# Patient Record
Sex: FEMALE | Race: WHITE | NOT HISPANIC OR LATINO
[De-identification: names, ages, dates, MRNs, and addresses within clinical notes are randomized per-mention and may not be internally consistent; named-entity substitution may affect disease eponyms.]

---

## 2020-04-30 ENCOUNTER — MESSAGE (OUTPATIENT)
Age: 70
End: 2020-04-30

## 2020-05-05 ENCOUNTER — APPOINTMENT (OUTPATIENT)
Dept: DISASTER EMERGENCY | Facility: CLINIC | Age: 70
End: 2020-05-05

## 2020-05-06 LAB
SARS-COV-2 IGG SERPL IA-ACNC: 0.5 RATIO
SARS-COV-2 IGG SERPL QL IA: NEGATIVE

## 2020-11-24 ENCOUNTER — TRANSCRIPTION ENCOUNTER (OUTPATIENT)
Age: 70
End: 2020-11-24

## 2021-04-07 ENCOUNTER — TRANSCRIPTION ENCOUNTER (OUTPATIENT)
Age: 71
End: 2021-04-07

## 2021-04-12 ENCOUNTER — TRANSCRIPTION ENCOUNTER (OUTPATIENT)
Age: 71
End: 2021-04-12

## 2021-06-05 ENCOUNTER — TRANSCRIPTION ENCOUNTER (OUTPATIENT)
Age: 71
End: 2021-06-05

## 2021-06-08 ENCOUNTER — TRANSCRIPTION ENCOUNTER (OUTPATIENT)
Age: 71
End: 2021-06-08

## 2022-11-06 ENCOUNTER — NON-APPOINTMENT (OUTPATIENT)
Age: 72
End: 2022-11-06

## 2022-11-08 ENCOUNTER — TRANSCRIPTION ENCOUNTER (OUTPATIENT)
Age: 72
End: 2022-11-08

## 2022-11-08 ENCOUNTER — NON-APPOINTMENT (OUTPATIENT)
Age: 72
End: 2022-11-08

## 2022-11-10 ENCOUNTER — APPOINTMENT (OUTPATIENT)
Dept: DISASTER EMERGENCY | Facility: HOSPITAL | Age: 72
End: 2022-11-10

## 2022-11-10 ENCOUNTER — OUTPATIENT (OUTPATIENT)
Dept: OUTPATIENT SERVICES | Facility: HOSPITAL | Age: 72
LOS: 1 days | End: 2022-11-10

## 2022-11-10 VITALS
OXYGEN SATURATION: 98 % | WEIGHT: 119.93 LBS | RESPIRATION RATE: 18 BRPM | HEART RATE: 70 BPM | SYSTOLIC BLOOD PRESSURE: 155 MMHG | DIASTOLIC BLOOD PRESSURE: 93 MMHG | TEMPERATURE: 98 F | HEIGHT: 63 IN

## 2022-11-10 VITALS
OXYGEN SATURATION: 98 % | DIASTOLIC BLOOD PRESSURE: 60 MMHG | TEMPERATURE: 98 F | HEART RATE: 67 BPM | SYSTOLIC BLOOD PRESSURE: 158 MMHG | RESPIRATION RATE: 18 BRPM

## 2022-11-10 DIAGNOSIS — U07.1 COVID-19: ICD-10-CM

## 2022-11-10 RX ORDER — BEBTELOVIMAB 87.5 MG/ML
175 INJECTION, SOLUTION INTRAVENOUS ONCE
Refills: 0 | Status: COMPLETED | OUTPATIENT
Start: 2022-11-10 | End: 2022-11-10

## 2022-11-10 RX ADMIN — BEBTELOVIMAB 175 MILLIGRAM(S): 87.5 INJECTION, SOLUTION INTRAVENOUS at 08:00

## 2022-11-10 NOTE — CHART NOTE - NSCHARTNOTEFT_GEN_A_CORE
CC: Monoclonal Antibody Infusion/COVID 19 Positive on 11/07/22  72y Female with recent dx of COVID 19+ who presents today for elective Bebtelovimab. Patient has been screened and was deemed to be a candidate.    Symptoms/ Criteria  Date of Symptom Onset: 11/007/22  Symptoms: fever cough sore throat malaise body aches  Date of Positive COVID PCR: 11/07/22  Risk Profile includes:   Age > 65, seizures     Vaccination Status: Moderna with Boosters x2    PMHx:  Infection due to severe acute respiratory syndrome coronavirus 2 (SARS-CoV-2)        Exam/findings:  T(C): 36.8 (11-10-22 @ 07:49), Max: 36.8 (11-10-22 @ 07:49)  HR: 70 (11-10-22 @ 07:49) (70 - 70)  BP: 155/93 (11-10-22 @ 07:49) (155/93 - 155/93)  RR: 18 (11-10-22 @ 07:49) (18 - 18)  SpO2: 98% (11-10-22 @ 07:49) (98% - 98%)    PE:   Appearance: NAD	  HEENT:  NC/AT  Cardiovascular:  No edema  Respiratory: no use of accessory muscles  Gastrointestinal:  non-distended   Skin: warm and dry  Neurologic: Non-focal  Extremities: Normal range of motion    ASSESSMENT:  Pt is COVID positive with mild to moderate symptoms who was referred for elective MAB (Bebtelovimab).    PLAN:  - MAB treatment explained to patient. I have reviewed the Bebtelovimab Emergency Use Authorization (EUA) and I have provided the patient or patient's caregiver with the following information:   1. FDA has authorized emergency use of Bebtelovimab to be administered for the treatment of mild to moderate COVID-19, which is not an FDA-approved biological product.   2. The patient or patient's caregiver has the option to accept or refuse administration of MAB.   3. The significant known and potential risks and benefits of Bebtelovimab and the extent to which such risks and benefits are unknown.  4. Information on available alternative treatments and risks and benefits of those alternatives.  - Patient verbalized understanding of plan and agrees to treatment. All questions answered.  - Consent for MAB obtained.   - 175mg of Bebtelovimab administered as a single intravenous injection over at least 30 seconds.   - Observe patient for one hour post medication administration and then if stable, discharge home with oupatient follow up as planned by PCP.      POST ASSESSMENT:   Patient completed MAB, and monitored x 1 hour post-infusion with no adverse reactions noted, remained hemodynamically stable.  - Patient tolerated infusion well; denies complaints of chest pain/SOB/dizziness/palpitations.   - VSS for discharge home.  - D/C instructions given/ fact sheet included.  - Patient was instructed to self-isolate and use infection control measures (e.g wear mask, isolate, social distance, avoid sharing personal items, clean and disinfect "high touch" surfaces, and frequent handwashing according to the CDC guidelines.   - The patient was informed on what symptoms to be aware of for the next couple of days, and if there are any issues to call the 24/7 clinical call center. Patient was instructed to follow up with primary care provider as needed.  -Discharge 1 hour post-injection

## 2022-12-27 PROBLEM — Z00.00 ENCOUNTER FOR PREVENTIVE HEALTH EXAMINATION: Status: ACTIVE | Noted: 2022-12-27

## 2023-08-27 ENCOUNTER — NON-APPOINTMENT (OUTPATIENT)
Age: 73
End: 2023-08-27

## 2023-11-24 ENCOUNTER — NON-APPOINTMENT (OUTPATIENT)
Age: 73
End: 2023-11-24

## 2023-11-29 ENCOUNTER — APPOINTMENT (OUTPATIENT)
Dept: NEUROSURGERY | Facility: CLINIC | Age: 73
End: 2023-11-29
Payer: COMMERCIAL

## 2023-11-29 DIAGNOSIS — R56.9 UNSPECIFIED CONVULSIONS: ICD-10-CM

## 2023-11-29 DIAGNOSIS — G45.0 VERTEBRO-BASILAR ARTERY SYNDROME: ICD-10-CM

## 2023-11-29 DIAGNOSIS — Z87.891 PERSONAL HISTORY OF NICOTINE DEPENDENCE: ICD-10-CM

## 2023-11-29 PROCEDURE — 99204 OFFICE O/P NEW MOD 45 MIN: CPT

## 2023-11-30 ENCOUNTER — APPOINTMENT (OUTPATIENT)
Dept: MRI IMAGING | Facility: CLINIC | Age: 73
End: 2023-11-30
Payer: COMMERCIAL

## 2023-11-30 ENCOUNTER — OUTPATIENT (OUTPATIENT)
Dept: OUTPATIENT SERVICES | Facility: HOSPITAL | Age: 73
LOS: 1 days | End: 2023-11-30
Payer: COMMERCIAL

## 2023-11-30 DIAGNOSIS — G45.0 VERTEBRO-BASILAR ARTERY SYNDROME: ICD-10-CM

## 2023-11-30 PROCEDURE — 70544 MR ANGIOGRAPHY HEAD W/O DYE: CPT

## 2023-11-30 PROCEDURE — 70544 MR ANGIOGRAPHY HEAD W/O DYE: CPT | Mod: 26

## 2023-12-01 PROBLEM — R56.9 SEIZURES: Status: RESOLVED | Noted: 2023-12-01 | Resolved: 2023-12-01

## 2023-12-01 PROBLEM — Z87.891 FORMER SMOKER: Status: ACTIVE | Noted: 2023-12-01

## 2023-12-01 RX ORDER — LAMOTRIGINE 150 MG/1
TABLET ORAL
Refills: 0 | Status: ACTIVE | COMMUNITY

## 2023-12-01 RX ORDER — EXTENDED PHENYTOIN SODIUM 30 MG/1
CAPSULE ORAL
Refills: 0 | Status: ACTIVE | COMMUNITY

## 2023-12-13 ENCOUNTER — APPOINTMENT (OUTPATIENT)
Dept: NEUROSURGERY | Facility: CLINIC | Age: 73
End: 2023-12-13
Payer: COMMERCIAL

## 2023-12-13 VITALS
HEIGHT: 63 IN | HEART RATE: 72 BPM | TEMPERATURE: 98.6 F | DIASTOLIC BLOOD PRESSURE: 88 MMHG | BODY MASS INDEX: 21.09 KG/M2 | WEIGHT: 119 LBS | SYSTOLIC BLOOD PRESSURE: 159 MMHG | OXYGEN SATURATION: 97 %

## 2023-12-13 DIAGNOSIS — Z78.9 OTHER SPECIFIED HEALTH STATUS: ICD-10-CM

## 2023-12-13 DIAGNOSIS — G93.9 DISORDER OF BRAIN, UNSPECIFIED: ICD-10-CM

## 2023-12-13 PROCEDURE — 99214 OFFICE O/P EST MOD 30 MIN: CPT

## 2023-12-18 NOTE — PHYSICAL EXAM
[FreeTextEntry1] : Constitutional: alert and in no acute distress. Psychiatric: oriented to person, place, and time, insight and judgment were intact and the affect was normal.   Orientation: oriented to person, oriented to place and oriented to time.   Memory: short term memory intact and remote memory intact.   Attention: the attention span was normal and normal concentrating ability.   Language: fluency intact and comprehension intact.   Fund of knowledge: displays adequate knowledge of current events, adequate knowledge of personal past history and adequate range of vocabulary.   Cranial Nerves: extraocular motion intact, facial sensation intact symmetrically, face symmetrical, hearing was intact bilaterally, tongue and palate midline, head turning and shoulder shrug symmetric and there was no tongue deviation with protrusion.   Motor: muscle tone was normal in all four extremities, muscle strength was normal in all four extremities, no involuntary movements were seen and normal bulk in all four extremities.   Motor Strength: No drift . the patient is left hand dominant.   Sensory exam: light touch was intact . Romberg's sign was negative.   Coordination:.imbalance. there was no past-pointing. no tremor present. ambulates without assistance, gait wide, very unsteady, wobbly. Finger to nose dysmetria was not present.   Eyes: extraocular movements were intact. Hearing loss left ear. Pulmonary: no respiratory distress, normal respiratory rhythm and effort and no accessory muscle use. Heart: heart rate was normal and rhythm regular. Musculoskeletal: no involuntary movements were seen. Skin: normal skin color and pigmentation and normal skin turgor.

## 2023-12-18 NOTE — REVIEW OF SYSTEMS
[Dizziness] : dizziness [As Noted in HPI] : as noted in HPI [Loss Of Hearing] : hearing loss [Negative] : Heme/Lymph [de-identified] : imbalance improving

## 2023-12-18 NOTE — END OF VISIT
[Time Spent: ___ minutes] : I have spent [unfilled] minutes of time on the encounter. [FreeTextEntry3] : Ms. Rivera's symptoms of dizziness and vertigo are greatly improved.  She visited the otolaryngologist who recommended steroids but she has not taken them yet.  I personally reviewed her MRA head and neck which was normal.  I recommended that she continue to follow-up with her otolaryngologist.  She also was informed of a nonspecific lesion in the left periatrial white matter which we will continue to observe with a repeat MRI with and without contrast in 1 year.

## 2023-12-18 NOTE — HISTORY OF PRESENT ILLNESS
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 She presented to [facility] in November with increased dizziness, and feeling off balance over the past week, worse since Thursday. She also endorses hearing loss on the left side, was supposed to see ENT next week. She states that she has had progressive imbalance and has difficulty standing to put on her clothes. She started walking with her feet wider apart to give her more balance. She has chronic left hearing loss for many years and uses a hearing aid, then suddenly 2 weeks ago, she lost hearing on the left side and there is a "tinny" sound. She denies room spinning sensation but rather like being on a boat rocking, leaning more to the left. 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 Recommended to get MRI brain w/wo contrast. MRI brain showed non specific signal abnormality in the left occipital white matter which is larger in size when compared to 3/2023. Pt reported feeling "off", imbalance and dizziness. She denies headaches, seizures, sensory changes. She has a history of Epilepsy since the age of 20 and takes Dilantin and Lamictal. MRA non NOVA unremarkable 11/30/2023.  Patient followed up with ENT who recommended a course of steroids which she is hesitant to take due to the possibility of insomnia which can exacerbate her seizures as per pt.  She reports her Dilantin was high, therefore, the dose was decreased by her neurologist and her imbalance has improved significantly.  Hearing loss on left remains.  Plan: BRAIN MRI W WO IN A YEAR

## 2024-01-31 ENCOUNTER — APPOINTMENT (OUTPATIENT)
Dept: NEUROLOGY | Facility: CLINIC | Age: 74
End: 2024-01-31

## 2025-01-13 ENCOUNTER — APPOINTMENT (OUTPATIENT)
Dept: UROLOGY | Facility: CLINIC | Age: 75
End: 2025-01-13
Payer: COMMERCIAL

## 2025-01-13 DIAGNOSIS — R35.1 NOCTURIA: ICD-10-CM

## 2025-01-13 DIAGNOSIS — N39.3 STRESS INCONTINENCE (FEMALE) (MALE): ICD-10-CM

## 2025-01-13 PROCEDURE — 99203 OFFICE O/P NEW LOW 30 MIN: CPT

## 2025-01-14 LAB
APPEARANCE: CLEAR
BACTERIA: NEGATIVE /HPF
BILIRUBIN URINE: NEGATIVE
BLOOD URINE: NEGATIVE
CAST: 0 /LPF
COLOR: YELLOW
EPITHELIAL CELLS: 6 /HPF
GLUCOSE QUALITATIVE U: NEGATIVE MG/DL
KETONES URINE: NEGATIVE MG/DL
LEUKOCYTE ESTERASE URINE: NEGATIVE
MICROSCOPIC-UA: NORMAL
NITRITE URINE: NEGATIVE
PH URINE: 7.5
PROTEIN URINE: NEGATIVE MG/DL
RED BLOOD CELLS URINE: 1 /HPF
SPECIFIC GRAVITY URINE: 1.02
UROBILINOGEN URINE: 0.2 MG/DL
WHITE BLOOD CELLS URINE: 0 /HPF

## 2025-01-15 LAB — BACTERIA UR CULT: NORMAL

## 2025-01-16 ENCOUNTER — APPOINTMENT (OUTPATIENT)
Dept: UROLOGY | Facility: CLINIC | Age: 75
End: 2025-01-16

## 2025-02-24 ENCOUNTER — APPOINTMENT (OUTPATIENT)
Dept: OTOLARYNGOLOGY | Facility: CLINIC | Age: 75
End: 2025-02-24
Payer: COMMERCIAL

## 2025-02-24 VITALS
HEIGHT: 63 IN | DIASTOLIC BLOOD PRESSURE: 94 MMHG | WEIGHT: 127 LBS | HEART RATE: 73 BPM | BODY MASS INDEX: 22.5 KG/M2 | SYSTOLIC BLOOD PRESSURE: 159 MMHG

## 2025-02-24 DIAGNOSIS — H90.A22 SENSORINEURAL HEARING LOSS, UNILATERAL, LEFT EAR, WITH RESTRICTED HEARING ON THE CONTRALATERAL SIDE: ICD-10-CM

## 2025-02-24 DIAGNOSIS — H69.93 UNSPECIFIED EUSTACHIAN TUBE DISORDER, BILATERAL: ICD-10-CM

## 2025-02-24 DIAGNOSIS — H90.5 UNSPECIFIED SENSORINEURAL HEARING LOSS: ICD-10-CM

## 2025-02-24 PROCEDURE — 92567 TYMPANOMETRY: CPT

## 2025-02-24 PROCEDURE — 99203 OFFICE O/P NEW LOW 30 MIN: CPT

## 2025-02-24 PROCEDURE — 92557 COMPREHENSIVE HEARING TEST: CPT

## 2025-02-24 RX ORDER — ASPIRIN 81 MG/1
81 TABLET ORAL
Refills: 0 | Status: ACTIVE | COMMUNITY

## 2025-02-24 RX ORDER — PHENYLEPHRINE TANNATE 7.5 MG/5ML
7.5 SUSPENSION, ORAL (FINAL DOSE FORM) ORAL
Refills: 0 | Status: ACTIVE | COMMUNITY

## 2025-02-25 ENCOUNTER — APPOINTMENT (OUTPATIENT)
Dept: OPHTHALMOLOGY | Facility: CLINIC | Age: 75
End: 2025-02-25
Payer: SELF-PAY

## 2025-02-25 ENCOUNTER — NON-APPOINTMENT (OUTPATIENT)
Age: 75
End: 2025-02-25

## 2025-02-25 ENCOUNTER — APPOINTMENT (OUTPATIENT)
Dept: OPHTHALMOLOGY | Facility: CLINIC | Age: 75
End: 2025-02-25
Payer: COMMERCIAL

## 2025-02-25 PROCEDURE — 92060 SENSORIMOTOR EXAMINATION: CPT

## 2025-02-25 PROCEDURE — 92015 DETERMINE REFRACTIVE STATE: CPT

## 2025-03-24 ENCOUNTER — APPOINTMENT (OUTPATIENT)
Dept: PHARMACY | Facility: CLINIC | Age: 75
End: 2025-03-24
Payer: SELF-PAY

## 2025-03-24 PROCEDURE — V5010 ASSESSMENT FOR HEARING AID: CPT | Mod: NC

## 2025-04-21 ENCOUNTER — APPOINTMENT (OUTPATIENT)
Dept: UROLOGY | Facility: CLINIC | Age: 75
End: 2025-04-21
Payer: COMMERCIAL

## 2025-04-21 VITALS
RESPIRATION RATE: 14 BRPM | HEIGHT: 63 IN | HEART RATE: 73 BPM | DIASTOLIC BLOOD PRESSURE: 78 MMHG | BODY MASS INDEX: 22.15 KG/M2 | OXYGEN SATURATION: 99 % | WEIGHT: 125 LBS | SYSTOLIC BLOOD PRESSURE: 133 MMHG

## 2025-04-21 DIAGNOSIS — R35.1 NOCTURIA: ICD-10-CM

## 2025-04-21 PROCEDURE — 99213 OFFICE O/P EST LOW 20 MIN: CPT

## 2025-04-22 ENCOUNTER — APPOINTMENT (OUTPATIENT)
Dept: OPHTHALMOLOGY | Facility: CLINIC | Age: 75
End: 2025-04-22

## 2025-04-25 RX ORDER — DESMOPRESSIN ACETATE 27.7 UG/1
27.7 TABLET SUBLINGUAL
Qty: 90 | Refills: 3 | Status: ACTIVE | COMMUNITY
Start: 2025-04-21 | End: 1900-01-01

## 2025-04-28 ENCOUNTER — APPOINTMENT (OUTPATIENT)
Dept: PHARMACY | Facility: CLINIC | Age: 75
End: 2025-04-28
Payer: COMMERCIAL

## 2025-04-28 PROCEDURE — V5221B: CUSTOM

## 2025-04-29 RX ORDER — DESMOPRESSIN ACETATE 0.1 MG/1
0.1 TABLET ORAL DAILY
Qty: 90 | Refills: 3 | Status: ACTIVE | COMMUNITY
Start: 2025-04-28 | End: 1900-01-01

## 2025-05-05 ENCOUNTER — APPOINTMENT (OUTPATIENT)
Dept: PHARMACY | Facility: CLINIC | Age: 75
End: 2025-05-05
Payer: SELF-PAY

## 2025-05-05 PROCEDURE — V5299A: CUSTOM

## 2025-05-12 ENCOUNTER — APPOINTMENT (OUTPATIENT)
Dept: PHARMACY | Facility: CLINIC | Age: 75
End: 2025-05-12
Payer: SELF-PAY

## 2025-05-12 PROCEDURE — V5299A: CUSTOM

## 2025-05-12 RX ORDER — MIRABEGRON 50 MG/1
50 TABLET, EXTENDED RELEASE ORAL
Qty: 90 | Refills: 3 | Status: ACTIVE | COMMUNITY
Start: 2025-05-12 | End: 1900-01-01

## 2025-05-12 RX ORDER — BRIVARACETAM 75 MG/1
TABLET, FILM COATED ORAL
Refills: 0 | Status: ACTIVE | COMMUNITY

## 2025-05-20 ENCOUNTER — NON-APPOINTMENT (OUTPATIENT)
Age: 75
End: 2025-05-20

## 2025-05-20 ENCOUNTER — APPOINTMENT (OUTPATIENT)
Dept: OPHTHALMOLOGY | Facility: CLINIC | Age: 75
End: 2025-05-20
Payer: COMMERCIAL

## 2025-05-20 PROCEDURE — 92012 INTRM OPH EXAM EST PATIENT: CPT

## 2025-06-02 ENCOUNTER — APPOINTMENT (OUTPATIENT)
Dept: PHARMACY | Facility: CLINIC | Age: 75
End: 2025-06-02
Payer: SELF-PAY

## 2025-06-02 PROCEDURE — V5299A: CUSTOM

## 2025-06-05 ENCOUNTER — APPOINTMENT (OUTPATIENT)
Dept: ORTHOPEDIC SURGERY | Facility: CLINIC | Age: 75
End: 2025-06-05
Payer: COMMERCIAL

## 2025-06-05 DIAGNOSIS — M19.072 PRIMARY OSTEOARTHRITIS, LEFT ANKLE AND FOOT: ICD-10-CM

## 2025-06-05 DIAGNOSIS — M77.42 METATARSALGIA, LEFT FOOT: ICD-10-CM

## 2025-06-05 PROCEDURE — 99203 OFFICE O/P NEW LOW 30 MIN: CPT

## 2025-06-05 RX ORDER — PHENYTOIN 50 MG/1
50 TABLET, CHEWABLE ORAL
Refills: 0 | Status: ACTIVE | COMMUNITY

## 2025-06-05 RX ORDER — SIMVASTATIN 40 MG/1
TABLET, FILM COATED ORAL
Refills: 0 | Status: ACTIVE | COMMUNITY

## 2025-06-09 ENCOUNTER — APPOINTMENT (OUTPATIENT)
Dept: UROLOGY | Facility: CLINIC | Age: 75
End: 2025-06-09

## 2025-06-30 ENCOUNTER — APPOINTMENT (OUTPATIENT)
Dept: PHARMACY | Facility: CLINIC | Age: 75
End: 2025-06-30
Payer: SELF-PAY

## 2025-06-30 PROCEDURE — V5299A: CUSTOM

## 2025-07-21 ENCOUNTER — APPOINTMENT (OUTPATIENT)
Dept: PHARMACY | Facility: CLINIC | Age: 75
End: 2025-07-21
Payer: SELF-PAY

## 2025-07-21 ENCOUNTER — APPOINTMENT (OUTPATIENT)
Dept: PHARMACY | Facility: CLINIC | Age: 75
End: 2025-07-21

## 2025-07-21 PROCEDURE — V5299A: CUSTOM

## 2025-09-04 ENCOUNTER — APPOINTMENT (OUTPATIENT)
Dept: ORTHOPEDIC SURGERY | Facility: CLINIC | Age: 75
End: 2025-09-04
Payer: COMMERCIAL

## 2025-09-04 VITALS — HEIGHT: 63 IN | BODY MASS INDEX: 22.15 KG/M2 | WEIGHT: 125 LBS

## 2025-09-04 DIAGNOSIS — M20.42 OTHER HAMMER TOE(S) (ACQUIRED), LEFT FOOT: ICD-10-CM

## 2025-09-04 DIAGNOSIS — Z87.891 PERSONAL HISTORY OF NICOTINE DEPENDENCE: ICD-10-CM

## 2025-09-04 DIAGNOSIS — Z78.9 OTHER SPECIFIED HEALTH STATUS: ICD-10-CM

## 2025-09-04 PROCEDURE — 99214 OFFICE O/P EST MOD 30 MIN: CPT

## 2025-09-15 ENCOUNTER — NON-APPOINTMENT (OUTPATIENT)
Age: 75
End: 2025-09-15